# Patient Record
Sex: MALE | Race: WHITE | URBAN - METROPOLITAN AREA
[De-identification: names, ages, dates, MRNs, and addresses within clinical notes are randomized per-mention and may not be internally consistent; named-entity substitution may affect disease eponyms.]

---

## 2021-09-21 ENCOUNTER — HOSPITAL ENCOUNTER (OUTPATIENT)
Dept: NUTRITION | Facility: HOSPITAL | Age: 46
End: 2021-09-21
Attending: INTERNAL MEDICINE | Admitting: INTERNAL MEDICINE

## 2021-09-21 LAB
ABS NEUT (OLG): 5.35 X10(3)/MCL (ref 2.1–9.2)
ALBUMIN SERPL-MCNC: 4.2 GM/DL (ref 3.5–5)
ALBUMIN/GLOB SERPL: 1.4 RATIO (ref 1.1–2)
ALP SERPL-CCNC: 83 UNIT/L (ref 40–150)
ALT SERPL-CCNC: 37 UNIT/L (ref 0–55)
AMPHET UR QL SCN: NEGATIVE
AST SERPL-CCNC: 32 UNIT/L (ref 5–34)
BARBITURATE SCN PRESENT UR: NEGATIVE
BASOPHILS # BLD AUTO: 0.1 X10(3)/MCL (ref 0–0.2)
BASOPHILS NFR BLD AUTO: 1 %
BENZODIAZ UR QL SCN: NEGATIVE
BILIRUB SERPL-MCNC: 1.1 MG/DL
BILIRUBIN DIRECT+TOT PNL SERPL-MCNC: 0.4 MG/DL (ref 0–0.5)
BILIRUBIN DIRECT+TOT PNL SERPL-MCNC: 0.7 MG/DL (ref 0–0.8)
BNP BLD-MCNC: <10 PG/ML
BUN SERPL-MCNC: 11.2 MG/DL (ref 8.9–20.6)
CALCIUM SERPL-MCNC: 9.4 MG/DL (ref 8.4–10.2)
CANNABINOIDS UR QL SCN: NEGATIVE
CHLORIDE SERPL-SCNC: 106 MMOL/L (ref 98–107)
CO2 SERPL-SCNC: 18 MMOL/L (ref 22–29)
COCAINE UR QL SCN: NEGATIVE
CREAT SERPL-MCNC: 1.01 MG/DL (ref 0.73–1.18)
EOSINOPHIL # BLD AUTO: 0.4 X10(3)/MCL (ref 0–0.9)
EOSINOPHIL NFR BLD AUTO: 4 %
ERYTHROCYTE [DISTWIDTH] IN BLOOD BY AUTOMATED COUNT: 13.1 % (ref 11.5–17)
GLOBULIN SER-MCNC: 3 GM/DL (ref 2.4–3.5)
GLUCOSE SERPL-MCNC: 134 MG/DL (ref 74–100)
HCT VFR BLD AUTO: 48 % (ref 42–52)
HGB BLD-MCNC: 17 GM/DL (ref 14–18)
LYMPHOCYTES # BLD AUTO: 2.8 X10(3)/MCL (ref 0.6–4.6)
LYMPHOCYTES NFR BLD AUTO: 29 %
MCH RBC QN AUTO: 32.3 PG (ref 27–31)
MCHC RBC AUTO-ENTMCNC: 35.4 GM/DL (ref 33–36)
MCV RBC AUTO: 91.3 FL (ref 80–94)
MONOCYTES # BLD AUTO: 1.1 X10(3)/MCL (ref 0.1–1.3)
MONOCYTES NFR BLD AUTO: 11 %
NEUTROPHILS # BLD AUTO: 5.35 X10(3)/MCL (ref 2.1–9.2)
NEUTROPHILS NFR BLD AUTO: 55 %
OPIATES UR QL SCN: NEGATIVE
PCP UR QL: NEGATIVE
PH UR STRIP.AUTO: 6 [PH] (ref 5–7.5)
PLATELET # BLD AUTO: 264 X10(3)/MCL (ref 130–400)
PMV BLD AUTO: 10.1 FL (ref 9.4–12.4)
POTASSIUM SERPL-SCNC: 4.3 MMOL/L (ref 3.5–5.1)
PROT SERPL-MCNC: 7.2 GM/DL (ref 6.4–8.3)
RBC # BLD AUTO: 5.26 X10(6)/MCL (ref 4.7–6.1)
SARS-COV-2 AG RESP QL IA.RAPID: NEGATIVE
SODIUM SERPL-SCNC: 139 MMOL/L (ref 136–145)
SP GR FLD REFRACTOMETRY: 1 (ref 1–1.03)
TROPONIN I SERPL-MCNC: <0.01 NG/ML (ref 0–0.04)
TROPONIN I SERPL-MCNC: <0.01 NG/ML (ref 0–0.04)
WBC # SPEC AUTO: 9.8 X10(3)/MCL (ref 4.5–11.5)

## 2022-04-30 NOTE — ED PROVIDER NOTES
"   Patient:   Duong Oliveira             MRN: [id number]            FIN: [id number]               Age:   46 years     Sex:  Male     :  1975   Associated Diagnoses:   Chest pain   Author:   Akanksha TAY, Carolyne García      Basic Information   Time seen: Date & time 2021 02:15:00.   History source: Patient, EMS.   Arrival mode: Ambulance.   History limitation: None.   Additional information: Chief Complaint from Nursing Triage Note : Chief Complaint   2021 1:38 CDT       Chief Complaint           c/o chest pain started x 1 hour ago. hx MI in 2021 with stent placement. statse pain feels the same. EMS gave 6 sprays nitro, 324mg ASA, 1" nitro paste. pain free at this time. "tired"  .      History of Present Illness   The patient presents with 47 y/o male with a history of CAD, MI status post stent placement 2021 presents to the ED via EMS c/o central chest pressure that began at rest, associated with nausea, diaphoresis, and SOB onset just PTA. Patient states  that the pain was similar, but less severe than his previous MI. EMS administered  6 sprays nitro, 324mg ASA, 1" nitro paste which relieved the pt's pain completely. During exam, pt states he has no current chest pain. He notes he is compliant with his medications. .  The onset was 21.  The course/duration of symptoms is improving.  Location: Central chest. Radiating pain: none. The character of symptoms is pressure.  The degree at onset was severe.  The degree at maximum was severe.  The degree at present is none.  The exacerbating factor is none.  There are relieving factors including nitroglycerin and degree of relief: Complete: .  .  Risk factors consist of coronary artery disease.  Prior episodes: coronary artery disease and myocardial infarction times 1.  Therapy today Nitroglycerin, Aspirin and EMS.  Associated symptoms: shortness of breath, nausea and diaphoresis.        Review of Systems   Constitutional symptoms:  No fever "   Skin symptoms:  No rash,    Eye symptoms:  No recent vision problems   ENMT symptoms:  No sore throat,    Respiratory symptoms:  Shortness of breathNo cough   Cardiovascular symptoms:  Chest pain, central, pressureNo syncope   Gastrointestinal symptoms:  Nausea, no abdominal pain, no vomiting, no diarrhea, no constipation.    Genitourinary symptoms:  No dysuria, no hematuria.    Neurologic symptoms:  No headache      Health Status   Allergies:    Allergic Reactions (Selected)  No Known Medication Allergies.   Medications:  (Selected)   Inpatient Medications  Ordered  NS (0.9% Sodium Chloride) Infusion 500 mL: 500 mL, 500 mL, IV, 500 mL/hr, start date 09/21/21 2:36:00 CDT, 2.19, m2  Documented Medications  Documented  Brilinta (ticagrelor) 90 mg oral tablet: 90 mg = 1 tab(s), Oral, BID  Pantoprazole 40 mg ORAL EC-Tablet: 40 mg = 1 tab(s), Oral, Daily  Ranexa 500mg Tab extended release: 500 mg = 1 tab(s), Oral, BID  allopurinol 100 mg oral tablet: 100 mg = 1 tab(s), Oral, Daily  atorvastatin 80 mg oral tablet: 80 mg = 1 tab(s), Oral, Daily  metoprolol tartrate 25 mg oral tab: 25 mg = 1 tab(s), Oral, BID  nitroglycerin 0.4 mg sublingual TAB: 0.4 mg = 1 tab(s), SL, q5min  podofilox 0.5% topical solution:   pramipexole 0.75 mg oral tablet: 0.75 mg = 1 tab(s), Oral, qPM.      Past Medical/ Family/ Social History   Medical history:   CAD  MI.   Surgical history:   stent placement Jan 2021.   Social history:    Social & Psychosocial Habits    Tobacco  09/21/2021  Use: Never (less than 100 in l    Patient Wants Consult For Cessation Counseling N/A    Abuse/Neglect  09/21/2021  SHX Any signs of abuse or neglect No  , Alcohol use: Occasionally, Tobacco use: Denies, Drug use: Denies.      Physical Examination               Vital Signs   Vital Signs   9/21/2021 1:58 CDT       Peripheral Pulse Rate     74 bpm                             Heart Rate Monitored      74 bpm                             Respiratory Rate          15  br/min                             SpO2                      94 %                             Oxygen Therapy            Room air                             Systolic Blood Pressure   106 mmHg                             Diastolic Blood Pressure  67 mmHg                             Mean Arterial Pressure, Cuff              80 mmHg    9/21/2021 1:38 CDT       Temperature Oral          36.6 DegC                             Temperature Oral (calculated)             97.88 DegF                             Peripheral Pulse Rate     73 bpm                             Respiratory Rate          19 br/min                             SpO2                      94 %                             Systolic Blood Pressure   109 mmHg                             Diastolic Blood Pressure  64 mmHg  .   Measurements   9/21/2021 1:38 CDT       Weight Dosing             102 kg                             Weight Measured and Calculated in Lbs     224.87 lb                             Weight Estimated          102 kg                             Height/Length Dosing      177 cm                             Height/Length Estimated   177 cm                             Body Mass Index Estimated 32.56 kg/m2  .   Basic Oxygen Information   9/21/2021 1:58 CDT       SpO2                      94 %                             Oxygen Therapy            Room air    9/21/2021 1:38 CDT       SpO2                      94 %  .   General:  Alert, no acute distress   Neurological:  Alert and oriented to person, place, time, and situation, No focal neurological deficit observed   Skin:  Warm, dry, intact   Head:  Normocephalic, atraumatic   Neck:  Supple, trachea midline   Eye:  Extraocular movements are intact, normal conjunctiva   Ears, nose, mouth and throat:  Dry mucous membranes    Cardiovascular:  Regular rate and rhythm   Respiratory:  Lungs are clear to auscultation, respirations are non-labored, breath sounds are equal, Symmetrical chest wall expansion.     Back:  Normal range of motion   Musculoskeletal:  Normal ROM   Gastrointestinal:  Soft, Nontender, Non distended, Normal bowel sounds   Psychiatric:  Cooperative      Medical Decision Making   Differential Diagnosis:  Myocardial infarction, non-ST elevation myocardial infarction, unstable angina, angina, gastroesophageal reflux disease.    Rationale:  46-year-old male with history of CAD here for chest discomfort similar to previous MI.  EKG nonischemic, chest x-ray negative for acute findings on my review.  Labs obtained at triage to expedite care are unremarkable, including troponin..  He is afebrile, hemodynamically stable.  Hospital medicine consulted for admission for serial troponins and cardiology consultation as warranted.   Documents reviewed:  Emergency department nurses' notes.   Orders  Launch Order Profile (Selected)   Inpatient Orders  Ordered  Admission Assessment Adult: 21 3:59:18 CDT, Stop date 21 3:59:18 CDT  Admission History Adult: 21 3:59:18 CDT, Stop date 21 3:59:18 CDT  Basic Admission Information Adult: 21 3:59:18 CDT, Stop date 21 3:59:18 CDT  Blood Pressure: 21 1:41:00 CDT, Stop date 21 1:41:00 CDT, Monitor blood pressure.  Capacity Management Bed Request: 21 3:25:41 CDT, Telemetry with Monitor  Cardiac Monitorin21 1:41:00 CDT, Constant Order, Place on telemetry.  Consult to Physician: 21 4:41:00 CDT, Rocío TAY, Johnson N, chest pain recent stent, No  Contact MD for order for Aspirin and NTG.: 21 1:41:00 CDT, Contact MD for order for Aspirin and NTG.  NPO: 21 4:41:00 CDT, CM NPO  NS (0.9% Sodium Chloride) Infusion 500 mL: 500 mL, 500 mL, IV, 500 mL/hr, start date 21 2:36:00 CDT, 2.19, m2  Oxygen Therapy: 21 1:41:00 CDT, Nasal Cannula, Maintain O2 saturation > 93%., CM Oxygen  Patient Isolation: 21 3:36:34 CDT, Contact Precautions, Constant Indicator  Patient Isolation: 21 3:36:34 CDT,  Droplet Precautions, Constant Indicator  Place in Outpatient Observation: 21 3:25:00 CDT, Telemetry with Monitor Niranjan TAY, Mendez LEE, Ruby  Please notify Attending MD of patients room number and as needed with any problems and/or question...: 21 4:41:00 CDT, Please notify Attending MD of patients room number and as needed with any problems and/or questions.  Pulse Oximetry: 21 1:41:00 CDT, Stop date 21 1:41:00 CDT, Place on pulse oximetry.  Monitor oxygen saturation.  Saline Lock Insert: 21 1:41:00 CDT, Stop date 21 1:41:00 CDT  Telemetry Monitorin21 4:41:00 CDT, Stop date 21 4:41:00 CDT, CM Telemetry Monitoring  Troponin-I: Timed collect, 21 4:39:00 CDT, Blood, q6hr for 3 dose(s), Stop date 21 22:59:00 CDT, Lab Collect, 21 5:00:00 CDT  Up ad Lakshmi: 21 4:41:00 CDT, Constant Order  Vital Signs: 21 4:41:00 CDT, q4hr  acetaminophen: 650 mg, form: Liquid, Oral, q6hr PRN for fever, first dose 21 4:41:00 CDT,  > 38.1 degrees Celsius (100.6 degrees Fahrenheit)  morphine 4 mg/mL preservative-free intravenous solution: 2 mg, form: Injection, IV Push, q4hr PRN for pain, severe, first dose 21 4:41:00 CDT, ( > 7 on pain scale)  Ordered (Exam Completed)  XR Chest 1 View: Stat, 21 1:41:00 CDT, Chest Pain, None, Portable, Rad Type, Not Scheduled, 21 1:41:00 CDT  Ordered (Scheduled)  BMP: Routine collect, 21 5:00:00 CDT, Blood, Once, Stop date 21 5:00:00 CDT, Lab Collect, 21 5:00:00 CDT  CBC w/ Auto Diff: Routine collect, 21 5:00:00 CDT, Blood, Once, Stop date 21 5:00:00 CDT, Lab Collect, 21 5:00:00 CDT  Completed  Automated Diff: Stat collect, 21 1:41:00 CDT, Blood, Collected, Stop date 21 1:41:00 CDT, Lab Collect, Print Label By Order Location, 21 1:41:00 CDT  BNP: Stat collect, 21 1:41:00 CDT, Blood, Stop date 21 1:41:00 CDT, Lab Collect, Print Label By Order  Location, 09/21/21 1:41:00 CDT  CBC w/ Auto Diff: Stat collect, 09/21/21 1:41:00 CDT, Blood, Stop date 09/21/21 1:41:00 CDT, Lab Collect, Print Label By Order Location, 09/21/21 1:41:00 CDT  CMP: Stat collect, 09/21/21 1:41:00 CDT, Blood, Stop date 09/21/21 1:41:00 CDT, Lab Collect, Print Label By Order Location, 09/21/21 1:41:00 CDT  COVID-19  IDnow: Stat collect, Nasal, 09/21/21 3:36:00 CDT, Stop date 09/21/21 3:36:00 CDT, Nurse collect  EKG Adult 12 Lead: 09/21/21 1:41:00 CDT, Stat, Chest Pain, 09/21/21 1:41:00 CDT, Show to provider upon completion., -1, -1, 09/21/21 1:41:00 CDT  Estimated Glomerular Filtration Rate: Stat collect, 09/21/21 1:41:00 CDT, Blood, Collected, Stop date 09/21/21 1:41:00 CDT, Lab Collect, Print Label By Order Location, 09/21/21 1:41:00 CDT  Troponin-I: Stat collect, 09/21/21 1:41:00 CDT, Blood, Stop date 09/21/21 1:41:00 CDT, Lab Collect, Print Label By Order Location, 09/21/21 1:41:00 CDT.   Electrocardiogram:  Time 9/21/2021 01:41:00, rate 77, normal sinus rhythm, No ST-T changes, no ectopy, normal MN & QRS intervals, EP Interp.    Results review:  Lab results : Lab View   9/21/2021 4:45 CDT       Troponin-I                <0.010 ng/mL    9/21/2021 3:47 CDT       COVID-19 Rapid            NEGATIVE    9/21/2021 2:47 CDT       Est Creat Clearance Ser   93.43 mL/min    9/21/2021 1:41 CDT       Sodium Lvl                139 mmol/L                             Potassium Lvl             4.3 mmol/L                             Chloride                  106 mmol/L                             CO2                       18 mmol/L  LOW                             Calcium Lvl               9.4 mg/dL                             Glucose Lvl               134 mg/dL  HI                             BUN                       11.2 mg/dL                             Creatinine                1.01 mg/dL                             eGFR-AA                   >60  NA                             eGFR-URIEL                   >60 mL/min/1.73 m2  NA                             Bili Total                1.1 mg/dL                             Bili Direct               0.4 mg/dL                             Bili Indirect             0.70 mg/dL                             AST                       32 unit/L                             ALT                       37 unit/L                             Alk Phos                  83 unit/L                             Total Protein             7.2 gm/dL                             Albumin Lvl               4.2 gm/dL                             Globulin                  3.0 gm/dL                             A/G Ratio                 1.4 ratio                             BNP                       <10.0 pg/mL                             Troponin-I                <0.010 ng/mL                             WBC                       9.8 x10(3)/mcL                             RBC                       5.26 x10(6)/mcL                             Hgb                       17.0 gm/dL                             Hct                       48.0 %                             Platelet                  264 x10(3)/mcL                             MCV                       91.3 fL                             MCH                       32.3 pg  HI                             MCHC                      35.4 gm/dL                             RDW                       13.1 %                             MPV                       10.1 fL                             Abs Neut                  5.35 x10(3)/mcL                             Neutro Auto               55 %  NA                             Lymph Auto                29 %  NA                             Mono Auto                 11 %  NA                             Eos Auto                  4 %  NA                             Abs Eos                   0.4 x10(3)/mcL                             Basophil Auto             1 %  NA                             Abs Neutro                 5.35 x10(3)/mcL                             Abs Lymph                 2.8 x10(3)/mcL                             Abs Mono                  1.1 x10(3)/mcL                             Abs Baso                  0.1 x10(3)/mcL  .   Chest X-Ray:  No acute disease process, interpretation by Emergency Physician.       Reexamination/ Reevaluation   Time: 9/21/2021 03:02:00 .   Vital signs   results included from flowsheet : Vital Signs   9/21/2021 1:58 CDT       Peripheral Pulse Rate     74 bpm                             Heart Rate Monitored      74 bpm                             Respiratory Rate          15 br/min                             SpO2                      94 %                             Oxygen Therapy            Room air                             Systolic Blood Pressure   106 mmHg                             Diastolic Blood Pressure  67 mmHg                             Mean Arterial Pressure, Cuff              80 mmHg     Course: progressing as expected.   Pain status: resolved.   Assessment: exam unchanged.      Impression and Plan   Diagnosis   Chest pain ([id number] R07.9)      Calls-Consults   -  9/21/2021 03:20:00 , Hospitalist, phone call.    Plan   Condition: Stable.    Disposition: Admit time  9/21/2021 03:21:00, Place in Observation Telemetry Unit.    Counseled: Patient, Friend, Regarding diagnosis, Regarding diagnostic results, Regarding treatment plan, Patient indicated understanding of instructions.    Notes: I, Kiara Chan, acted solely as a scribe for and in the presence of Dr. Vale who performed this service. , I, Dr. Carolyne Vale, personally evaluated and examined this patient and agree with the documentation as above. .

## 2022-05-03 NOTE — HISTORICAL [facility] CERNER
This is a historical note converted from Jonathan. Formatting and pictures may have been removed.  Please reference Jonathan for original formatting and attached multimedia. Chief Complaint  Chest pain  History of Present Illness  Duong Oliveira?is a?46-year-old gentleman with a history of?CAD/MI status post?cardiac stent placement?in 01/2021, hypertension, and hyperlipidemia who presents to the ED?via EMS with complaints of?a sudden onset substernal, nonradiating?chest pain described as?initially sharp?and then?became?a?pressure which began while at rest around 2100 on 09/20/2021?and was associated with nausea, diaphoresis, and shortness of breath.?He states the pain?was similar, but less severe?than his previous MI.??Upon arrival EMS?gave x6 sprays of nitro,? mg,?and 1 inch Nitropaste which relieved?the chest pain completely.? He reports compliance with his current?home medications. ?He denies any symptoms?since arrival to the hospital.? He does?have a significant?family history?of CAD and multiple MIs. ?He denies tobacco?or illicit drug use. His vital signs?are currently stable.??His labs showed a bicarb 18, but otherwise is mostly unremarkable. ?Troponin x2 negative. ?COVID-19 negative. EKG showed?NSR with a HR 77 and no ST-T changes.  Review of Systems  As per HPI otherwise all systems reviewed and negative.  Physical Exam  Vitals & Measurements  T:?36.6? ?C (Oral)? HR:?62(Peripheral)? HR:?62(Monitored)? RR:?10? BP:?108/69? SpO2:?96%? WT:?102?kg?  General:?well-developed well-nourished in no acute distress  Eye: PERRLA, EOMI, clear conjunctiva, eyelids normal  HENT:? oropharynx without erythema/exudate, oropharynx and nasal mucosal surfaces moist  Neck: full range of motion, no lymphadenopathy  Respiratory:?clear to auscultation bilaterally  Cardiovascular:?regular rate and rhythm without murmurs, gallops or rubs  Gastrointestinal:?soft, non-tender, non-distended with normal bowel sounds, without masses to  palpation  Genitourinary: no CVA tenderness to palpation  Musculoskeletal:?full range of motion of all extremities/spine without limitation or discomfort  Integumentary: warm, dry  Neurologic: cranial nerves intact, motor/sensory function intact  Assessment/Plan  Chest pain, likely noncardiac  History of an MI s/p cardiac stents?in 01/2021  History of hyperlipidemia  History of hypertension  Possible GERD  Obesity  ?  Plan:  -Control pain and nausea as needed  -Continue to trend troponin (trop x2 negative)  -Cardiac meal plan  -CXR-pending  -Consult [facility] for further evaluation of chest pain?with recent stent placement  -Repeat labs in a.m.  -Resume home medications  ?  DVT prophylaxis: SCDs/Brilinta  Code Status: Full  PCP: None  ?  I, Kai Davis PA-C, discussed this case with Dr. Johnson.  Please see addendum for further assessment and plan per MD.  ?   zzjg  ?  46-year-old obese  gentleman with HTN, HLD, recent MI s/p PCI earlier this year, presented with acute onset substernal chest pain not associated with diaphoresis. ?His pain had?subsided with nitro and aspirin. ?He was hemodynamically stable. ?Agree with the HPI, exam, plan mentioned above. ?Physical exam was unremarkable. ?Troponin is normal. ?Medically stable for discharge once [facility] clears him.??He will benefit from weight loss, outpatient evaluation for sleep apnea.? Continue Protonix for possible underlying GERD.  ?  ?  ?   Problem List/Past Medical History  MI  Hypertension  Hyperlipidemia  CAD  Procedure/Surgical History  Cardiac stent?01/2021  Medications  Inpatient  acetaminophen, 650 mg= 20.3 mL, Oral, q6hr, PRN  morphine 4 mg/mL preservative-free intravenous solution, 2 mg= 0.5 mL, IV Push, q4hr, PRN  NS (0.9% Sodium Chloride) Infusion 500 mL, 500 mL, IV  Home  allopurinol 100 mg oral tablet, 100 mg= 1 tab(s), Oral, Daily  atorvastatin 80 mg oral tablet, 80 mg= 1 tab(s), Oral, Daily  Brilinta (ticagrelor) 90 mg oral tablet, 90 mg= 1  tab(s), Oral, BID  metoprolol tartrate 25 mg oral tab, 25 mg= 1 tab(s), Oral, BID  nitroglycerin 0.4 mg sublingual TAB, 0.4 mg= 1 tab(s), SL, q5min  Pantoprazole 40 mg ORAL EC-Tablet, 40 mg= 1 tab(s), Oral, Daily  podofilox 0.5% topical solution  pramipexole 0.75 mg oral tablet, 0.75 mg= 1 tab(s), Oral, qPM  Ranexa 500mg Tab extended release, 500 mg= 1 tab(s), Oral, BID  Allergies  No Known Medication Allergies  Social History  Abuse/Neglect  No, 09/21/2021  Tobacco  Never (less than 100 in lifetime), N/A, 09/21/2021  Alcohol- socially  Illicit drugs- none  Family History  Father-CAD  Mother-CAD  Maternal?grandparents-multiple MIs, CAD  Lab Results  Labs Last 24 Hours?  ?Chemistry? Hematology/Coagulation?   Sodium Lvl: 139 mmol/L (09/21/21 01:41:00) WBC: 9.8 x10(3)/mcL (09/21/21 01:41:00)   Potassium Lvl: 4.3 mmol/L (09/21/21 01:41:00) RBC: 5.26 x10(6)/mcL (09/21/21 01:41:00)   Chloride: 106 mmol/L (09/21/21 01:41:00) Hgb: 17 gm/dL (09/21/21 01:41:00)   CO2:?18 mmol/L?Low (09/21/21 01:41:00) Hct: 48 % (09/21/21 01:41:00)   Calcium Lvl: 9.4 mg/dL (09/21/21 01:41:00) Platelet: 264 x10(3)/mcL (09/21/21 01:41:00)   Glucose Lvl:?134 mg/dL?High (09/21/21 01:41:00) MCV: 91.3 fL (09/21/21 01:41:00)   BUN: 11.2 mg/dL (09/21/21 01:41:00) MCH:?32.3 pg?High (09/21/21 01:41:00)   Creatinine: 1.01 mg/dL (09/21/21 01:41:00) MCHC: 35.4 gm/dL (09/21/21 01:41:00)   Est Creat Clearance Ser: 93.43 mL/min (09/21/21 02:47:48) RDW: 13.1 % (09/21/21 01:41:00)   eGFR-AA: >60 (09/21/21 01:41:00) MPV: 10.1 fL (09/21/21 01:41:00)   eGFR-URIEL: >60 (09/21/21 01:41:00) Abs Neut: 5.35 x10(3)/mcL (09/21/21 01:41:00)   Bili Total: 1.1 mg/dL (09/21/21 01:41:00) Neutro Auto: 55 % (09/21/21 01:41:00)   Bili Direct: 0.4 mg/dL (09/21/21 01:41:00) Lymph Auto: 29 % (09/21/21 01:41:00)   Bili Indirect: 0.7 mg/dL (09/21/21 01:41:00) Mono Auto: 11 % (09/21/21 01:41:00)   AST: 32 unit/L (09/21/21 01:41:00) Eos Auto: 4 % (09/21/21 01:41:00)   ALT: 37 unit/L  (09/21/21 01:41:00) Abs Eos: 0.4 x10(3)/mcL (09/21/21 01:41:00)   Alk Phos: 83 unit/L (09/21/21 01:41:00) Basophil Auto: 1 % (09/21/21 01:41:00)   Total Protein: 7.2 gm/dL (09/21/21 01:41:00) Abs Neutro: 5.35 x10(3)/mcL (09/21/21 01:41:00)   Albumin Lvl: 4.2 gm/dL (09/21/21 01:41:00) Abs Lymph: 2.8 x10(3)/mcL (09/21/21 01:41:00)   Globulin: 3 gm/dL (09/21/21 01:41:00) Abs Mono: 1.1 x10(3)/mcL (09/21/21 01:41:00)   A/G Ratio: 1.4 ratio (09/21/21 01:41:00) Abs Baso: 0.1 x10(3)/mcL (09/21/21 01:41:00)   BNP: <10.0 (09/21/21 01:41:00)    Troponin-I: <0.010 (09/21/21 04:45:00)        ?  Patient was discharged from cardiology standpoint.?Would benefit from few day?Claritin?and nasal decongestant therapy?to help with allergies